# Patient Record
Sex: MALE | Race: WHITE | NOT HISPANIC OR LATINO | Employment: FULL TIME | URBAN - NONMETROPOLITAN AREA
[De-identification: names, ages, dates, MRNs, and addresses within clinical notes are randomized per-mention and may not be internally consistent; named-entity substitution may affect disease eponyms.]

---

## 2024-09-05 ENCOUNTER — OFFICE VISIT (OUTPATIENT)
Dept: URGENT CARE | Facility: CLINIC | Age: 44
End: 2024-09-05
Payer: COMMERCIAL

## 2024-09-05 VITALS
HEIGHT: 71 IN | HEART RATE: 78 BPM | DIASTOLIC BLOOD PRESSURE: 68 MMHG | OXYGEN SATURATION: 98 % | BODY MASS INDEX: 32.9 KG/M2 | SYSTOLIC BLOOD PRESSURE: 122 MMHG | RESPIRATION RATE: 15 BRPM | TEMPERATURE: 96.1 F | WEIGHT: 235 LBS

## 2024-09-05 DIAGNOSIS — M77.8 TENDINITIS OF RIGHT SHOULDER: Primary | ICD-10-CM

## 2024-09-05 PROCEDURE — S9083 URGENT CARE CENTER GLOBAL: HCPCS

## 2024-09-05 PROCEDURE — G0382 LEV 3 HOSP TYPE B ED VISIT: HCPCS

## 2024-09-05 RX ORDER — PHENYTOIN SODIUM 100 MG/1
CAPSULE, EXTENDED RELEASE ORAL
COMMUNITY

## 2024-09-05 RX ORDER — PREDNISONE 10 MG/1
TABLET ORAL
Qty: 36 TABLET | Refills: 0 | Status: SHIPPED | OUTPATIENT
Start: 2024-09-05 | End: 2024-09-14

## 2024-09-05 RX ORDER — ACETAMINOPHEN 325 MG/1
650 TABLET ORAL EVERY 6 HOURS PRN
COMMUNITY

## 2024-09-05 NOTE — PATIENT INSTRUCTIONS
"Take prescribed medication as instructed.   Tylenol for pain. May continue with NSAIDS once prednisone is finished.   Ice 3-4 times a day for 10 to 15 minutes.  Try to stay mobile with right shoulder.  Keeping edema mild may increase stiffness.  Follow-up with family doctor if no improvement.  Follow up with PCP in 3-5 days.  Proceed to  ER if symptoms worsen.    If tests are performed, our office will contact you with results only if changes need to made to the care plan discussed with you at the visit. You can review your full results on [facility].  Patient Education     Shoulder pain   The Basics   Written by the doctors and editors at [facility]   What are the parts of the shoulder? -- The shoulder joint is made up of the upper arm bone, collarbone, and shoulder blade. It includes muscles, ligaments, tendons, and bursae (figure 1). All of these parts work together to help the shoulder move comfortably in different directions.  What can cause shoulder pain? -- Shoulder pain can happen when you damage or injure any of the different parts of the shoulder.  Different conditions can cause shoulder pain. They include:   Bursitis - This is a condition that can cause pain or swelling next to a joint. A \"bursa\" is a small fluid-filled sac that sits near a bone. It cushions and protects nearby tissues when they rub on or slide over bones. Bursitis is when a bursa gets irritated and swollen.   Frozen shoulder - This is a condition that causes the shoulder to be stiff, painful, and hard to move. If you have a frozen shoulder, the tissue around the shoulder joint gets thick and tight. This might happen after a shoulder gets injury or surgery.   Rotator cuff injury - The rotator cuff is made up of 4 shoulder muscles and their tendons. Tendons are strong bands of tissue that connect muscles to bones. Rotator cuff injuries cause pain in your shoulder and sometimes in your upper arm.   Shoulder impingement - This happens " "when a muscle, tendon, or bursa gets squeezed between bones.    shoulder - This happens when certain ligaments tear or get stretched too much. Ligaments are strong bands of tissue that connect bone to bone. The most common causes of a  shoulder are falling on the shoulder or getting hit in the shoulder. A  shoulder can be mild or severe, depending on how many ligaments are torn.   Osteoarthritis - This is a common condition that often comes with age. The cartilage in the joints wears down, causing the bones to rub against each other. This can cause pain, stiffness, and swelling in the shoulder joints.  Will I need tests? -- Maybe. Your doctor or nurse will talk with you and do an exam. They might also do an imaging test of your shoulder such as an X-ray, MRI, or ultrasound. Imaging tests create pictures of the inside of the body.  How is shoulder pain treated? -- Many causes of shoulder pain get better on their own. But it can take weeks to months to heal completely.  Your doctor might recommend:   Pain-relieving medicines called \"nonsteroidal anti-inflammatory drugs\" (NSAIDs) - These include ibuprofen (sample brand names: Advil, Motrin) and naproxen (sample brand name: Aleve). They can reduce pain and swelling.   Exercises and stretches - It can help to work with a physical therapist (exercise expert). They can teach you gentle stretches and exercises to help with your symptoms. Follow the physical therapist's advice for how often and when to do the exercises.   Steroid injections - Steroid medicines help reduce inflammation. Doctors can inject steroids into the shoulder to help reduce symptoms.   Surgery - Surgery might be an option if other treatments do not work and you have had symptoms for a long time.  Is there anything I can do on my own to feel better?    Rest your shoulder - Avoid lifting things, reaching overhead or across your chest, or sleeping on the shoulder that hurts. If " your doctor gave you a sling to support your arm, follow instructions for using it. Or you might get a bandage that goes around your shoulders and upper back instead.   Take medicine to reduce pain and swelling - To treat pain, you can take acetaminophen (sample brand name: Tylenol). To treat pain and swelling, you can take ibuprofen (sample brand names: Advil, Motrin).   Prop your shoulder on pillows - Keep it raised above the level of your heart. This can help with pain and swelling.   Ice your shoulder - Put a cold gel pack, bag of ice, or bag of frozen vegetables on the injured area every 1 to 2 hours, for 15 minutes each time. Put a thin towel between the ice (or other cold object) and your skin. Use the ice (or other cold object) for at least 6 hours after your injury. Some people find it helpful to ice longer, even up to 2 days after their injury. Ice can also be helpful after doing shoulder exercises.   Put heat on the area to reduce pain and stiffness - Do not use heat for more than 20 minutes at a time. Also, do not use anything too hot that could burn your skin.   Do pendulum exercises to keep your shoulder from getting too stiff (figure 2):   Let your arm relax and hang down while you sit or stand. Gently move your arm:   Back and forth at your side   Side to side across your body   Around in small circles   Do this exercise for 5 minutes, 1 or 2 times a day.   Start slowly, and make the exercises harder over time. For example, make small circles with your arm at first. Over time, make bigger circles or hold weights in your hand.   Your doctor, nurse, or physical therapist can show you how to do other exercises to strengthen the muscles around your shoulder. They will tell you when to start them and how often to do them.   Before exercising your shoulder, warm up the muscles first. You can do this by taking a hot shower or bath, or using a heating pad. Some soreness is normal. If you have sharp, tearing,  "or worsening pain, stop what you're doing and let your doctor or nurse know.  When should I call the doctor? -- Call for emergency help right away (in the [place] and [place], call 9-1-1) if:   You have shoulder pain and start to have trouble breathing or bad chest discomfort.  Call the doctor or nurse for advice if:   You have very bad pain that is not helped by medicines.   Your hand or arm becomes weak or swollen.   Your fingers are numb, tingly, or blue or gray in color.  All topics are updated as new evidence becomes available and our peer review process is complete.  This topic retrieved from [employer] on: Apr 25, 2024.  Topic 994091 Version 3.0  Release: 32.4.2 - C32.114  © 2024 [employer] and/or its affiliates. All rights reserved.  figure 1: Parts of the shoulder     These are the different parts of the shoulder as viewed from the front (A) and the back (B). The shoulder joint is made up of the upper arm bone, collarbone, and shoulder blade. Ligaments, muscles, and tendons help hold the joint in place and allow you to move your arm. A \"bursa\" is a small fluid-filled sac that sits near a bone. It cushions and protects nearby tissues when they rub on or slide over bones.  Graphic 704084 Version 1.0  figure 2: Pendulum exercises     Letyour arm relax and hang down while you sit or stand. Gently move your arm backand forth at your side, then side to side across your body, and then around insmall circles. Do this exercise for 5 minutes, 1 or 2 times a day. Youcan make this exercise harder by making bigger movements or holding a smallweight in your hand.  Graphic 994738 Version 1.0  Consumer Information Use and Disclaimer   Disclaimer: This generalized information is a limited summary of diagnosis, treatment, and/or medication information. It is not meant to be comprehensive and should be used as a tool to help the user understand and/or assess potential diagnostic and treatment options. It does NOT include all " information about conditions, treatments, medications, side effects, or risks that may apply to a specific patient. It is not intended to be medical advice or a substitute for the medical advice, diagnosis, or treatment of a health care provider based on the health care provider's examination and assessment of a patient's specific and unique circumstances. Patients must speak with a health care provider for complete information about their health, medical questions, and treatment options, including any risks or benefits regarding use of medications. This information does not endorse any treatments or medications as safe, effective, or approved for treating a specific patient. [employer] and its affiliates disclaim any warranty or liability relating to this information or the use thereof.The use of this information is governed by the Terms of Use, available at https://www.w[employer]tersModaMiuwer.com/en/know/clinical-effectiveness-terms. 2024© [employer] and its affiliates and/or licensors. All rights reserved.  Copyright   © 2024 [employer] and/or its affiliates. All rights reserved.

## 2024-09-05 NOTE — PROGRESS NOTES
[facility]        NAME: Yovani Landers is a 44 y.o. male  : 1980    MRN: [id number]  DATE: 2024  TIME: 6:53 PM    Assessment and Plan   Tendinitis of right shoulder [M77.8]  1. Tendinitis of right shoulder  predniSONE 10 mg tablet        Atraumatic right anterior shoulder pain going on for the last 3 days.  No prior injury or surgery.  He did not fall or lift anything heavy over the shoulder  .  Mild tenderness over the anterior glenohumeral joint.  No swelling, erythema, ecchymosis.  Limited range of motion past 90 degrees with flexion and abduction.  Equal  strength and sensation bilaterally.  Suspecting tendinitis due to repetitive motions that he has been performing lately.  Will give trial of prednisone taper.  Advised follow-up with PCP if no improvement.    Patient Instructions     Take prescribed medication as instructed.   Tylenol for pain. May continue with NSAIDS once prednisone is finished.   Ice 3-4 times a day for 10 to 15 minutes.  Try to stay mobile with right shoulder.  Keeping edema mild may increase stiffness.  Follow-up with family doctor if no improvement.  Follow up with PCP in 3-5 days.  Proceed to  ER if symptoms worsen.    If tests are performed, our office will contact you with results only if changes need to made to the care plan discussed with you at the visit. [facility].    Chief Complaint     Chief Complaint   Patient presents with    Shoulder Injury     Pain started Tuesday. Rt shoulder pain. Limited ROM. 8/10 pain w/movement. OTC motrin last taken Tuesday. No change w/movement or pain since Tuesday.          History of Present Illness       44-year-old male presents the clinic with right anterior shoulder pain and stiffness that began on Tuesday.  He cannot recall any injury or fall.  No prior injury/surgery or trauma to the right shoulder in the past.  Has been taking over-the-counter NSAID with minimal  "improvement.  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  Pain worse with forward flexion and abduction.  Denies any other symptoms at this time such as numbness or tingling.  Denies neck pain.        Review of Systems   Review of Systems   Constitutional: Negative.    Respiratory: Negative.     Cardiovascular: Negative.    Musculoskeletal:  Positive for arthralgias.   Skin: Negative.    Neurological: Negative.          Current Medications       Current Outpatient Medications:     acetaminophen (TYLENOL) 325 mg tablet, Take 650 mg by mouth every 6 (six) hours as needed, Disp: , Rfl:     Multiple Vitamins-Minerals (MULTIVITAMIN-MINERALS PO), Take 1 tablet by mouth daily, Disp: , Rfl:     phenytoin (DILANTIN) 100 mg ER capsule, TAKE 2 CAPSULES BY MOUTH IN THE MORNING AND 3 IN THE EVENING, Disp: , Rfl:     predniSONE 10 mg tablet, Take 6 tablets (60 mg total) by mouth daily for 3 days, THEN 4 tablets (40 mg total) daily for 3 days, THEN 2 tablets (20 mg total) daily for 3 days., Disp: 36 tablet, Rfl: 0    Current Allergies     Allergies as of 09/05/2024 - Reviewed 09/05/2024   Allergen Reaction Noted    Naproxen Swelling 05/27/2011    Poison ivy extract Rash 10/01/2018            The following portions of the patient's history were reviewed and updated as appropriate: allergies, current medications, past family history, past medical history, past social history, past surgical history and problem list.     Past Medical History:   Diagnosis Date    Seizure (HCC)        History reviewed. No pertinent surgical history.    History reviewed. No pertinent family history.      Medications have been verified.        Objective   /68   Pulse 78   Temp (!) 96.1 °F (35.6 °C)   Resp 15   Ht 5' 11\" (1.803 m)   Wt 107 kg (235 lb)   SpO2 98%   BMI 32.78 kg/m²        Physical Exam     Physical Exam  Constitutional:       General: He is not in acute distress.     Appearance: " Normal appearance. He is not ill-appearing or diaphoretic.   HENT:      Head: Normocephalic and atraumatic.      Mouth/Throat:      Mouth: Mucous membranes are moist.      Pharynx: Oropharynx is clear.   Cardiovascular:      Rate and Rhythm: Normal rate and regular rhythm.      Pulses: Normal pulses.      Heart sounds: Normal heart sounds.   Pulmonary:      Effort: Pulmonary effort is normal. No respiratory distress.      Breath sounds: Normal breath sounds.   Musculoskeletal:      Right shoulder: Tenderness (Tenderness to palpation over the right anterior humeral region.  No AC joint tenderness.  No bony tenderness.  Able to perform flexion and abduction to 90 degrees.  Equal strength and sensation.  Negative empty can/lift off.  Negative Neer test.) present. No swelling, deformity, effusion or bony tenderness. Decreased range of motion. Normal strength. Normal pulse.      Left shoulder: Normal.        Arms:    Skin:     General: Skin is warm and dry.      Capillary Refill: Capillary refill takes less than 2 seconds.      Findings: No bruising, erythema or rash.   Neurological:      General: No focal deficit present.      Mental Status: He is alert and oriented to person, place, and time.   Psychiatric:         Mood and Affect: Mood normal.